# Patient Record
Sex: MALE | Race: WHITE | Employment: FULL TIME | URBAN - METROPOLITAN AREA
[De-identification: names, ages, dates, MRNs, and addresses within clinical notes are randomized per-mention and may not be internally consistent; named-entity substitution may affect disease eponyms.]

---

## 2022-03-19 PROBLEM — R03.0 ELEVATED BP WITHOUT DIAGNOSIS OF HYPERTENSION: Status: ACTIVE | Noted: 2020-06-24

## 2022-08-29 ENCOUNTER — OFFICE VISIT (OUTPATIENT)
Dept: OCCUPATIONAL MEDICINE | Age: 37
End: 2022-08-29

## 2022-08-29 DIAGNOSIS — M1A.09X0 IDIOPATHIC CHRONIC GOUT, MULTIPLE SITES, WITHOUT TOPHUS (TOPHI): Primary | ICD-10-CM

## 2022-08-29 PROCEDURE — 99212 OFFICE O/P EST SF 10 MIN: CPT | Performed by: NURSE PRACTITIONER

## 2022-08-29 RX ORDER — ALLOPURINOL 100 MG/1
250 TABLET ORAL EVERY MORNING
COMMUNITY
End: 2022-08-29 | Stop reason: SDUPTHER

## 2022-08-29 RX ORDER — VENLAFAXINE HYDROCHLORIDE 37.5 MG/1
37.5 CAPSULE, EXTENDED RELEASE ORAL EVERY MORNING
COMMUNITY
Start: 2022-01-19 | End: 2022-11-04 | Stop reason: SDUPTHER

## 2022-08-29 RX ORDER — ALLOPURINOL 100 MG/1
250 TABLET ORAL EVERY MORNING
Qty: 30 TABLET | Refills: 0 | Status: SHIPPED | OUTPATIENT
Start: 2022-08-29 | End: 2022-10-07 | Stop reason: SDUPTHER

## 2022-08-29 NOTE — PROGRESS NOTES
Re Lang is a 40 y.o. male Here today for medication refill on   Requested Prescriptions     Signed Prescriptions Disp Refills    allopurinol (ZYLOPRIM) 100 MG tablet 30 tablet 0     Sig: Take 2.5 tablets by mouth every morning     Has been tolerating medication without problem. Uses as prescribed and tolerates well without side effects/adverse reactions. Denies SOB/CP/N/V/Fever/rash/visio changes/palpitations/ syncope/pain/arthralgia/myalgia/suicidal or homicidal ideations. Alert and oriented x3; No acute distress. Well groomed, steady gait. S1 S2 Regular rate and rhythm, no JVD, no carotid bruit, no murmur/gallops/rubs  Lungs clear to auscultation bilaterally  Skin warm dry intact, mucous membranes moist    A:    Diagnosis Orders   1. Idiopathic chronic gout, multiple sites, without tophus (tophi)  allopurinol (ZYLOPRIM) 100 MG tablet          Follow up: Patient was encouraged to return to the clinic and/or PCP if s/s persist or do not resolve completely. Also advised to seek emergent care if worsening signs and symptoms warrant immediate evaluation including, but not limited to HA, blurred vision, speech disturbance, difficulty with ambulation/gait, numbness, tingling, weakness, syncope, abdominal pain, chest pain, or shortness of breath. *Side effects, adverse effects, risks versus benefits associated with medications prescribed/recommended were discussed with the patient. Patient verbalized understanding. All questions answered. Patient to follow up with PCP as scheduled.       * I have reviewed the patient's medication list, past medical, family, social, and surgical    history and updated the patient record appropriately      Social History     Socioeconomic History    Marital status:      Spouse name: Not on file    Number of children: Not on file    Years of education: Not on file    Highest education level: Not on file   Occupational History    Not on file   Tobacco Use    Smoking status: Never    Smokeless tobacco: Not on file   Substance and Sexual Activity    Alcohol use: Yes    Drug use: No    Sexual activity: Not on file   Other Topics Concern    Not on file   Social History Narrative    Not on file     Social Determinants of Health     Financial Resource Strain: Not on file   Food Insecurity: Not on file   Transportation Needs: Not on file   Physical Activity: Not on file   Stress: Not on file   Social Connections: Not on file   Intimate Partner Violence: Not on file   Housing Stability: Not on file     No past medical history on file. Past Surgical History:   Procedure Laterality Date    ORTHOPEDIC SURGERY      Left Knee     No family history on file.

## 2022-10-07 ENCOUNTER — OFFICE VISIT (OUTPATIENT)
Dept: OCCUPATIONAL MEDICINE | Age: 37
End: 2022-10-07

## 2022-10-07 DIAGNOSIS — M1A.09X0 IDIOPATHIC CHRONIC GOUT, MULTIPLE SITES, WITHOUT TOPHUS (TOPHI): ICD-10-CM

## 2022-10-07 DIAGNOSIS — R51.9 ACUTE NONINTRACTABLE HEADACHE, UNSPECIFIED HEADACHE TYPE: Primary | ICD-10-CM

## 2022-10-07 DIAGNOSIS — R11.2 NAUSEA AND VOMITING, UNSPECIFIED VOMITING TYPE: ICD-10-CM

## 2022-10-07 DIAGNOSIS — Z76.0 MEDICATION REFILL: ICD-10-CM

## 2022-10-07 LAB
GLUCOSE, POC: 103 MG/DL
QUICKVUE INFLUENZA TEST: NEGATIVE
SARS-COV-2, POC: NORMAL
VALID INTERNAL CONTROL, POC: YES

## 2022-10-07 RX ORDER — ALLOPURINOL 100 MG/1
100 TABLET ORAL EVERY MORNING
Qty: 90 TABLET | Refills: 0 | Status: SHIPPED | OUTPATIENT
Start: 2022-10-07

## 2022-10-07 ASSESSMENT — ENCOUNTER SYMPTOMS
NAUSEA: 1
PHOTOPHOBIA: 1
RESPIRATORY NEGATIVE: 1
VOMITING: 1
ALLERGIC/IMMUNOLOGIC NEGATIVE: 1
DIARRHEA: 0

## 2022-10-07 NOTE — PROGRESS NOTES
PROGRESS NOTE    SUBJECTIVE:   Michelle Limon is a 40 y.o. male seen for headache a long with nausea and vomiting that started yesterday evening. Patient reports history of headaches (1 every 2 months) for which he takes Excedrin or Tylenol and this helps. Denies aura with headache; however, he does have sensitivity to light and sounds during headache. He has had nausea/vomiting with headaches previously. Denies any exposure to sick contacts at work or home. He is having a birthday party for his child tomorrow and would like to be tested for Covid just to be sure. Also, he requests a medication refill of his allopurinol for gout. He usually takes only one 100 mg tab every morning even though the bottle states take 2.5 tabs every morning. 100 mg works to prevent his gout attacks and he is tolerating this medication well. Chief Complaint    Headache; Medication Refill       Headache  Headache pattern:  Headache sometimes there, sometimes not at all  Initial event:  None  Recent changes:  No recent change in headache pattern  Frequency:  Less than 1 per month  Providers seen:  None  Laterality:  Both sides at the same time  Location:  Temples/sides and back of head  Headaches last more than three days?: No    Aggravating factors:  Light and noise    Current Outpatient Medications   Medication Sig Dispense Refill    allopurinol (ZYLOPRIM) 100 MG tablet Take 1 tablet by mouth every morning 90 tablet 0    venlafaxine (EFFEXOR XR) 37.5 MG extended release capsule Take 37.5 mg by mouth every morning       No current facility-administered medications for this visit. No Known Allergies    Social History     Tobacco Use    Smoking status: Never   Substance Use Topics    Alcohol use: Yes    Drug use: No      Review of Systems   Constitutional:  Positive for appetite change and diaphoresis. Patient states he did not feel like eating this morning; although, he usually eats breakfast everyday. HENT: Negative. Eyes:  Positive for photophobia. Respiratory: Negative. Cardiovascular: Negative. Gastrointestinal:  Positive for nausea and vomiting. Negative for diarrhea. Endocrine: Negative. Genitourinary: Negative. Musculoskeletal: Negative. Skin: Negative. Allergic/Immunologic: Negative. Neurological:  Positive for headaches. Negative for dizziness, tremors, seizures, syncope, facial asymmetry, speech difficulty, weakness, light-headedness and numbness. Hematological: Negative. Psychiatric/Behavioral: Negative. OBJECTIVE:    Results for orders placed or performed in visit on 10/07/22   AMB POC SARS-COV-2   Result Value Ref Range    SARS-COV-2, POC Not-Detected Not Detected   AMB POC RAPID INFLUENZA TEST   Result Value Ref Range    Valid Internal Control, POC Yes     QuickVue Influenza test Negative Negative   AMB POC GLUCOSE BLOOD, BY GLUCOSE MONITORING DEVICE   Result Value Ref Range    Glucose,  MG/DL     Physical Exam  Constitutional:       General: He is not in acute distress. Appearance: Normal appearance. He is diaphoretic. HENT:      Head: Normocephalic and atraumatic. Pulmonary:      Effort: Pulmonary effort is normal.   Skin:     General: Skin is warm. Neurological:      Mental Status: He is alert and oriented to person, place, and time. Psychiatric:         Mood and Affect: Mood normal.         Behavior: Behavior normal.       ASSESSMENT and PLAN    Ann Marie Franco was seen today for headache and medication refill.     Diagnoses and all orders for this visit:    Acute nonintractable headache, unspecified headache type  -     AMB POC SARS-COV-2  -     AMB POC RAPID INFLUENZA TEST  -     AMB POC GLUCOSE BLOOD, BY GLUCOSE MONITORING DEVICE    Nausea and vomiting, unspecified vomiting type  -     AMB POC SARS-COV-2  -     AMB POC RAPID INFLUENZA TEST  -     AMB POC GLUCOSE BLOOD, BY GLUCOSE MONITORING DEVICE    Idiopathic chronic gout, multiple sites, without tophus (tophi)  -     allopurinol (ZYLOPRIM) 100 MG tablet; Take 1 tablet by mouth every morning    Medication refill    Covid and flu negative. Can retest for Covid at home tomorrow before [place] party. Continue Excedrin or Tylenol as needed for headaches. [facility]pto bismol from [facility]  as needed for GI upset. RTC if symptoms worsen or fail to improve. To ER with severe and/or unrelenting headache, change in mental status, numbness/tingling in extremities, severe abdominal pain, unable to eat/drink due to nausea/vomiting. Eat breakfast and stay hydrated. Counseled on benefits of having a primary care provider which includes, but is not limited to, continuity of care and having a medical home when concerns arise. Also enforced that onsite clinic policy states that we are not to take the place of a primary care provider, pt verbalized understanding. SEs and risk vs benefits associated with medications prescribed discussed with patient who verbalized understanding. Pt verbalized understanding and agreement with plan of care. RTC for persisting/worsening symptoms or new complaints that arise. Discussed signs and symptoms that would warrant immediate evaluation including, but not limited to HA, blurred vision, speech disturbance, difficulty with ambulation/gait, numbness, tingling, weakness, syncope, chest pain, or shortness of breath. I have reviewed the patient's medication list, past medical, family, social, and surgical history in detail and updated the patient record appropriately.     Dallas Notice, APRN - CNP

## 2022-11-04 ENCOUNTER — OFFICE VISIT (OUTPATIENT)
Dept: OCCUPATIONAL MEDICINE | Age: 37
End: 2022-11-04

## 2022-11-04 DIAGNOSIS — Z76.0 ENCOUNTER FOR MEDICATION REFILL: ICD-10-CM

## 2022-11-04 DIAGNOSIS — F34.89 OTHER SPECIFIED PERSISTENT MOOD DISORDERS (HCC): Primary | ICD-10-CM

## 2022-11-04 RX ORDER — VENLAFAXINE HYDROCHLORIDE 37.5 MG/1
37.5 CAPSULE, EXTENDED RELEASE ORAL EVERY MORNING
Qty: 90 CAPSULE | Refills: 0 | Status: SHIPPED | OUTPATIENT
Start: 2022-11-04

## 2022-11-04 NOTE — PROGRESS NOTES
PROGRESS NOTE    SUBJECTIVE:   Nestor Lopez is a 40 y.o. male seen for medication fill. Patient requests venlafaxine hydrochloride XR 37.5 mg. He takes this medication for anxiety and depression. Patient is tolerating this medication well. He only has dizziness and tiredness if he forgets to take his medication. Patient was prescribed this medication by Dr. [address] (PCP) at [address] [place] [address] in [facility]. Patient saw him last 1 year ago. Patient states he tried to get an appointment this week but they were fully booked. No other questions or concerns. Chief Complaint    Medication Refill       Current Outpatient Medications   Medication Sig Dispense Refill    venlafaxine (EFFEXOR XR) 37.5 MG extended release capsule Take 1 capsule by mouth every morning 90 capsule 0    allopurinol (ZYLOPRIM) 100 MG tablet Take 1 tablet by mouth every morning 90 tablet 0     No current facility-administered medications for this visit. No Known Allergies    Social History     Tobacco Use    Smoking status: Never   Substance Use Topics    Alcohol use: Yes    Drug use: No       OBJECTIVE:  There were no vitals taken for this visit. Physical Exam  Constitutional:       Appearance: Normal appearance. Pulmonary:      Effort: Pulmonary effort is normal.   Skin:     General: Skin is dry. Neurological:      Mental Status: He is alert and oriented to person, place, and time. Psychiatric:         Mood and Affect: Mood normal.         Behavior: Behavior normal.     ASSESSMENT and PLAN    Nuno Nick was seen today for medication refill. Diagnoses and all orders for this visit:    Other specified persistent mood disorders (HCC)  -     venlafaxine (EFFEXOR XR) 37.5 MG extended release capsule; Take 1 capsule by mouth every morning    Encounter for medication refill    Continue medication as prescribed. Follow up with PCP for routine care. To ER with homicidal/suicidal thoughts/panic attack.  RTC with any questions or concerns. Counseled on benefits of having a primary care provider which includes, but is not limited to, continuity of care and having a medical home when concerns arise. Also enforced that onsite clinic policy states that we are not to take the place of a primary care provider, pt verbalized understanding. SEs and risk vs benefits associated with medications prescribed discussed with patient who verbalized understanding. Pt verbalized understanding and agreement with plan of care. RTC for persisting/worsening symptoms or new complaints that arise. Discussed signs and symptoms that would warrant immediate evaluation including, but not limited to HA, blurred vision, speech disturbance, difficulty with ambulation/gait, numbness, tingling, weakness, syncope, chest pain, or shortness of breath. I have reviewed the patient's medication list, past medical, family, social, and surgical history in detail and updated the patient record appropriately.     Diane Kwon, APRN - CNP

## 2022-12-16 ENCOUNTER — OFFICE VISIT (OUTPATIENT)
Dept: OCCUPATIONAL MEDICINE | Age: 37
End: 2022-12-16

## 2022-12-16 VITALS
DIASTOLIC BLOOD PRESSURE: 80 MMHG | SYSTOLIC BLOOD PRESSURE: 120 MMHG | HEART RATE: 98 BPM | TEMPERATURE: 98.6 F | OXYGEN SATURATION: 99 %

## 2022-12-16 DIAGNOSIS — R09.81 NASAL CONGESTION: ICD-10-CM

## 2022-12-16 DIAGNOSIS — J01.90 ACUTE NON-RECURRENT SINUSITIS, UNSPECIFIED LOCATION: Primary | ICD-10-CM

## 2022-12-16 LAB
INFLUENZA A ANTIGEN, POC: NEGATIVE
INFLUENZA B ANTIGEN, POC: NEGATIVE
SARS-COV-2 RNA, POC: NEGATIVE

## 2022-12-16 RX ORDER — AMOXICILLIN AND CLAVULANATE POTASSIUM 875; 125 MG/1; MG/1
1 TABLET, FILM COATED ORAL 2 TIMES DAILY
Qty: 14 TABLET | Refills: 0 | Status: SHIPPED | OUTPATIENT
Start: 2022-12-16 | End: 2022-12-23

## 2022-12-16 ASSESSMENT — ENCOUNTER SYMPTOMS
CHEST TIGHTNESS: 0
SWOLLEN GLANDS: 0
WHEEZING: 0
HOARSE VOICE: 0
GASTROINTESTINAL NEGATIVE: 1
SORE THROAT: 1
COUGH: 1
RHINORRHEA: 1
EYES NEGATIVE: 1
SINUS PRESSURE: 1
SINUS PAIN: 1
SHORTNESS OF BREATH: 0

## 2022-12-16 NOTE — PROGRESS NOTES
PROGRESS NOTE    SUBJECTIVE:   Natalia Aponte is a 40 y.o. male seen for:    Chief Complaint    Sinusitis       Sinusitis  This is a new problem. The current episode started 1 to 4 weeks ago. The problem has been gradually worsening since onset. There has been no fever. The pain is moderate. Associated symptoms include chills, congestion, coughing, headaches, sinus pressure and a sore throat. Pertinent negatives include no diaphoresis, ear pain, hoarse voice, neck pain, shortness of breath, sneezing or swollen glands. Past treatments include nothing. Current Outpatient Medications   Medication Sig Dispense Refill    amoxicillin-clavulanate (AUGMENTIN) 875-125 MG per tablet Take 1 tablet by mouth 2 times daily for 7 days 14 tablet 0    venlafaxine (EFFEXOR XR) 37.5 MG extended release capsule Take 1 capsule by mouth every morning 90 capsule 0    allopurinol (ZYLOPRIM) 100 MG tablet Take 1 tablet by mouth every morning 90 tablet 0     No current facility-administered medications for this visit. No Known Allergies    Social History     Tobacco Use    Smoking status: Never   Substance Use Topics    Alcohol use: Yes    Drug use: No      Review of Systems   Constitutional:  Positive for chills. Negative for diaphoresis. HENT:  Positive for congestion, rhinorrhea, sinus pressure, sinus pain and sore throat. Negative for ear discharge, ear pain, hoarse voice and sneezing. Eyes: Negative. Respiratory:  Positive for cough. Negative for chest tightness, shortness of breath and wheezing. Cardiovascular: Negative. Gastrointestinal: Negative. Endocrine: Negative. Genitourinary: Negative. Musculoskeletal:  Negative for arthralgias, myalgias and neck pain. Skin: Negative. Allergic/Immunologic: Positive for environmental allergies. Neurological:  Positive for headaches. Negative for dizziness and light-headedness. Hematological: Negative. Psychiatric/Behavioral: Negative. OBJECTIVE:  /80 (Site: Left Upper Arm, Position: Sitting)   Pulse 98   Temp 98.6 °F (37 °C) (Oral)   SpO2 99%      Results for orders placed or performed in visit on 12/16/22   AMB POC SARS-COV-2 AND INFLUENZA A/B   Result Value Ref Range    SARS-COV-2 RNA, POC Negative     Influenza A Antigen, POC Negative Negative    Influenza B Antigen, POC Negative Negative     Physical Exam  Vitals reviewed. Constitutional:       Appearance: Normal appearance. HENT:      Head: Normocephalic and atraumatic. Right Ear: Hearing, ear canal and external ear normal. A middle ear effusion is present. Tympanic membrane is injected. Left Ear: Hearing, ear canal and external ear normal.  No middle ear effusion. Tympanic membrane is injected. Nose: Congestion and rhinorrhea present. Right Turbinates: Swollen. Left Turbinates: Swollen. Mouth/Throat:      Mouth: Mucous membranes are moist.      Pharynx: Oropharynx is clear. Posterior oropharyngeal erythema present. No oropharyngeal exudate. Eyes:      Conjunctiva/sclera: Conjunctivae normal.   Cardiovascular:      Rate and Rhythm: Normal rate and regular rhythm. Pulses: Normal pulses. Heart sounds: Normal heart sounds. Pulmonary:      Effort: Pulmonary effort is normal.      Breath sounds: Normal breath sounds. Skin:     General: Skin is warm and dry. Neurological:      Mental Status: He is alert and oriented to person, place, and time. Psychiatric:         Mood and Affect: Mood normal.         Behavior: Behavior normal.     ASSESSMENT and PLAN    Trent Godinez was seen today for sinusitis. Diagnoses and all orders for this visit:    Acute non-recurrent sinusitis, unspecified location  -     amoxicillin-clavulanate (AUGMENTIN) 875-125 MG per tablet; Take 1 tablet by mouth 2 times daily for 7 days    Nasal congestion  -     AMB POC SARS-COV-2 AND INFLUENZA A/B    COVID/FLU negative. Take Augmentin with food to avoid GI upset.  Sudafed per packaging instructions as needed for nasal congestion. Flonase per packaging instructions as needed for nasal congestion & seasonal allergies. To urgent care/ER if symptoms worsen over weekend. RTC if symptoms fail to improve with treatment. Counseled on benefits of having a primary care provider which includes, but is not limited to, continuity of care and having a medical home when concerns arise. Also enforced that onsite clinic policy states that we are not to take the place of a primary care provider, pt verbalized understanding. SEs and risk vs benefits associated with medications prescribed discussed with patient who verbalized understanding. Pt verbalized understanding and agreement with plan of care. RTC for persisting/worsening symptoms or new complaints that arise. Discussed signs and symptoms that would warrant immediate evaluation including, but not limited to HA, blurred vision, speech disturbance, difficulty with ambulation/gait, numbness, tingling, weakness, syncope, chest pain, or shortness of breath. I have reviewed the patient's medication list, past medical, family, social, and surgical history in detail and updated the patient record appropriately.     Darren Mascorro, APRN - CNP

## 2023-10-11 ENCOUNTER — OFFICE VISIT (OUTPATIENT)
Dept: OCCUPATIONAL MEDICINE | Age: 38
End: 2023-10-11

## 2023-10-11 DIAGNOSIS — H91.93 BILATERAL HEARING LOSS, UNSPECIFIED HEARING LOSS TYPE: ICD-10-CM

## 2023-10-11 DIAGNOSIS — H66.90 ACUTE OTITIS MEDIA, UNSPECIFIED OTITIS MEDIA TYPE: Primary | ICD-10-CM

## 2023-10-11 RX ORDER — AMOXICILLIN AND CLAVULANATE POTASSIUM 875; 125 MG/1; MG/1
1 TABLET, FILM COATED ORAL 2 TIMES DAILY
Qty: 14 TABLET | Refills: 0 | Status: SHIPPED | OUTPATIENT
Start: 2023-10-11 | End: 2023-10-18

## 2023-10-11 ASSESSMENT — ENCOUNTER SYMPTOMS
SINUS PAIN: 0
DIARRHEA: 0
SORE THROAT: 0
EYES NEGATIVE: 1
VOMITING: 0
SHORTNESS OF BREATH: 0
SINUS PRESSURE: 0
COUGH: 0
RHINORRHEA: 0
ABDOMINAL PAIN: 0

## 2023-10-11 NOTE — PROGRESS NOTES
Negative. Musculoskeletal:  Negative for arthralgias, myalgias and neck pain. Skin:  Negative for rash. Allergic/Immunologic: Positive for environmental allergies. Neurological:  Negative for dizziness and headaches. Hematological: Negative. Psychiatric/Behavioral: Negative. OBJECTIVE:    Physical Exam  Constitutional:       General: He is not in acute distress. Appearance: Normal appearance. He is not ill-appearing, toxic-appearing or diaphoretic. HENT:      Head: Normocephalic and atraumatic. Right Ear: Decreased hearing noted. Tympanic membrane is erythematous and bulging. Left Ear: Tympanic membrane, ear canal and external ear normal. Decreased hearing noted. Nose: Nose normal.      Mouth/Throat:      Mouth: Mucous membranes are moist.      Pharynx: Oropharynx is clear. Pulmonary:      Effort: Pulmonary effort is normal.   Skin:     General: Skin is warm and dry. Neurological:      Mental Status: He is alert and oriented to person, place, and time. Psychiatric:         Mood and Affect: Mood normal.         Behavior: Behavior normal.     ASSESSMENT and PLAN    Jim Calloway was seen today for ear fullness and decreased hearing. Diagnoses and all orders for this visit:    Acute otitis media, unspecified otitis media type  Comments:  Return to clinic if  symptoms worsen or fail to improve with treatment. Orders:  -     amoxicillin-clavulanate (AUGMENTIN) 875-125 MG per tablet; Take 1 tablet by mouth 2 times daily for 7 days    Bilateral hearing loss, unspecified hearing loss type  -     [facility]    To [facility] for further evaluation of hearing loss. Provided patient with office number to call and schedule appointment. Reach out to clinic with any questions or concerns.  Follow up with PCP for routine physical.     Counseled on benefits of having a primary care provider which includes, but is not limited to, continuity of care and having a

## 2023-11-07 ENCOUNTER — OFFICE VISIT (OUTPATIENT)
Dept: AUDIOLOGY | Age: 38
End: 2023-11-07
Payer: COMMERCIAL

## 2023-11-07 DIAGNOSIS — H90.6 MIXED HEARING LOSS, BILATERAL: Primary | ICD-10-CM

## 2023-11-07 PROCEDURE — 92557 COMPREHENSIVE HEARING TEST: CPT | Performed by: AUDIOLOGIST

## 2023-11-07 PROCEDURE — 92567 TYMPANOMETRY: CPT | Performed by: AUDIOLOGIST

## 2023-11-07 NOTE — PROGRESS NOTES
AUDIOLOGY EVALUATION    Keren Boone had Tympanometry and Audiometry performed today. The patient reports hearing loss. Results as follows:    Tympanometry    Type C -  bilaterally    Audiometry    Test Performed - Comprehensive Audiogram    Type of Loss - Right Ear: abnormal hearing: degree of loss is normal to mild mixed krissy loss                           Left Ear: abnormal hearing: degree of loss is normal to moderate mixed krissy loss     SRT   Measurement Right Ear Left Ear   Value 30 30   Unit dB dB     Discrimination  Measurement Right Ear Left Ear   Value 100% 100%   Unit dB dB     Recommend  Retest following otologic management    A.  [address] [facility], [address] [facility]  Audiologist

## 2023-11-10 ENCOUNTER — OFFICE VISIT (OUTPATIENT)
Dept: ENT CLINIC | Age: 38
End: 2023-11-10

## 2023-11-10 VITALS — HEIGHT: 78 IN | HEART RATE: 84 BPM | BODY MASS INDEX: 35.29 KG/M2 | OXYGEN SATURATION: 97 % | WEIGHT: 305 LBS

## 2023-11-10 DIAGNOSIS — H90.6 MIXED HEARING LOSS, BILATERAL: ICD-10-CM

## 2023-11-10 DIAGNOSIS — H69.93 DYSFUNCTION OF BOTH EUSTACHIAN TUBES: Primary | ICD-10-CM

## 2023-11-10 RX ORDER — AZELASTINE 1 MG/ML
2 SPRAY, METERED NASAL 2 TIMES DAILY
Qty: 120 ML | Refills: 1 | Status: SHIPPED | OUTPATIENT
Start: 2023-11-10

## 2023-11-10 NOTE — PROGRESS NOTES
Darylene Marshal, MD [address] [facility]  [address] [facility], [facility] [place] [facility]  P: [phone]          OFFICE VISIT       11/10/2023    Chief Complaint   Patient presents with    New Patient    Hearing Problem       HPI:  Julia Lara is a 45 y.o. male seen in consultation today at the request of nurse at work for   Chief Complaint   Patient presents with    New Patient    Hearing Problem   . Onset of symptoms: years  Frequency (daily, weekly,every night, every morning, constant): daily  Alleviating factors or medications: None  Associated with pain and if so scale (1-10): none  Is condition becoming progressively worse: yes  Associated symptoms within upper aerodigestive tract: nasal congestion sometimes, difficulty popping ears      Current Outpatient Medications:     azelastine (ASTELIN) 0.1 % nasal spray, 2 sprays by Nasal route 2 times daily Use in each nostril as directed, Disp: 120 mL, Rfl: 1    allopurinol (ZYLOPRIM) 300 MG tablet, , Disp: , Rfl:     venlafaxine (EFFEXOR XR) 37.5 MG extended release capsule, Take 1 capsule by mouth every morning, Disp: 90 capsule, Rfl: 0    Past Medical History:   Diagnosis Date    Anxiety     Depression     Gout     Hearing loss        Past Surgical History:   Procedure Laterality Date    MYRINGOTOMY W/ TUBES      ORTHOPEDIC SURGERY      Left Knee        No family history on file.      Social History     Socioeconomic History    Marital status:      Spouse name: Not on file    Number of children: Not on file    Years of education: Not on file    Highest education level: Not on file   Occupational History    Not on file   Tobacco Use    Smoking status: Never    Smokeless tobacco: Not on file   Substance and Sexual Activity    Alcohol use: Yes    Drug use: No    Sexual activity: Not on file   Other Topics Concern    Not on file   Social History Narrative    Not on file     Social Determinants of Health     Financial Resource Strain: Not on file   Food

## 2023-12-13 ENCOUNTER — OFFICE VISIT (OUTPATIENT)
Dept: OCCUPATIONAL MEDICINE | Age: 38
End: 2023-12-13

## 2023-12-13 DIAGNOSIS — L25.9 CONTACT DERMATITIS, UNSPECIFIED CONTACT DERMATITIS TYPE, UNSPECIFIED TRIGGER: Primary | ICD-10-CM

## 2023-12-13 RX ORDER — PREDNISONE 5 MG/1
TABLET ORAL
Qty: 21 EACH | Refills: 0 | Status: SHIPPED | OUTPATIENT
Start: 2023-12-13 | End: 2023-12-15 | Stop reason: ALTCHOICE

## 2023-12-13 NOTE — PROGRESS NOTES
PROGRESS NOTE    SUBJECTIVE:     Jennifer Lockhart is a 45 y.o. male seen for:    Chief Complaint    Rash       Rash  This is a new problem. The current episode started today. The problem has been gradually worsening since onset. The affected locations include the head, neck, back, torso and face. The rash is characterized by blistering, pain, redness and itchiness. He was exposed to nothing. Pertinent negatives include no anorexia, congestion, cough, diarrhea, eye pain, facial edema, fatigue, fever, joint pain, nail changes, rhinorrhea, shortness of breath, sore throat or vomiting. Past treatments include nothing. His past medical history is significant for allergies. There is no history of asthma, eczema or varicella. Denies recent exposure to plants/chemicals or new soaps/lotions. Patient believes he may have shingles, but has never experienced shingles before. Patient states the rash is painful when his clothing rubs it. Current Outpatient Medications   Medication Sig Dispense Refill    predniSONE 5 MG (21) TBPK Take as directed by 5 mg dosepack administration instructions 21 each 0    azelastine (ASTELIN) 0.1 % nasal spray 2 sprays by Nasal route 2 times daily Use in each nostril as directed 120 mL 1    allopurinol (ZYLOPRIM) 300 MG tablet       venlafaxine (EFFEXOR XR) 37.5 MG extended release capsule Take 1 capsule by mouth every morning 90 capsule 0     No current facility-administered medications for this visit. No Known Allergies    Social History     Tobacco Use    Smoking status: Never   Substance Use Topics    Alcohol use: Yes    Drug use: No      Review of Systems   Constitutional:  Negative for chills, diaphoresis, fatigue and fever. HENT:  Negative for congestion, rhinorrhea and sore throat. Eyes:  Negative for pain. Respiratory:  Negative for cough and shortness of breath. Cardiovascular: Negative. Gastrointestinal:  Negative for anorexia, diarrhea and vomiting.    Endocrine:

## 2023-12-15 ENCOUNTER — OFFICE VISIT (OUTPATIENT)
Dept: OCCUPATIONAL MEDICINE | Age: 38
End: 2023-12-15

## 2023-12-15 DIAGNOSIS — B02.9 HERPES ZOSTER WITHOUT COMPLICATION: Primary | ICD-10-CM

## 2023-12-15 RX ORDER — VALACYCLOVIR HYDROCHLORIDE 1 G/1
1000 TABLET, FILM COATED ORAL 3 TIMES DAILY
Qty: 21 TABLET | Refills: 0 | Status: SHIPPED | OUTPATIENT
Start: 2023-12-15 | End: 2023-12-22

## 2023-12-15 NOTE — PROGRESS NOTES
PROGRESS NOTE    SUBJECTIVE:     Sienna Glez is a 45 y.o. male seen for:    Chief Complaint    Rash; Follow-up       Patient last seen in this clinic on Wed 12/13 for rash. Patient was given Prednisone dose pack for contact dermatitis. Patient states he started the Prednisone on Wednesday afternoon as prescribed, but has noticed the rash has worsened. The rash is characterized as painful, itchy, burning, and blistering. Patient states the rash on his back and abdomen have worsened; however, the rash on his left neck and forehead have improved. Denies history of shingles and/or eczema. Current Outpatient Medications   Medication Sig Dispense Refill    valACYclovir (VALTREX) 1 g tablet Take 1 tablet by mouth 3 times daily for 7 days 21 tablet 0    azelastine (ASTELIN) 0.1 % nasal spray 2 sprays by Nasal route 2 times daily Use in each nostril as directed 120 mL 1    allopurinol (ZYLOPRIM) 300 MG tablet       venlafaxine (EFFEXOR XR) 37.5 MG extended release capsule Take 1 capsule by mouth every morning 90 capsule 0     No current facility-administered medications for this visit. No Known Allergies    Social History     Tobacco Use    Smoking status: Never   Substance Use Topics    Alcohol use: Yes    Drug use: No      Review of Systems   Constitutional: Negative. HENT: Negative. Eyes: Negative. Respiratory: Negative. Cardiovascular: Negative. Gastrointestinal: Negative. Endocrine: Negative. Genitourinary: Negative. Musculoskeletal: Negative. Skin:  Positive for rash. Allergic/Immunologic: Negative. Neurological: Negative. Hematological: Negative. Psychiatric/Behavioral: Negative. OBJECTIVE:    Physical Exam  Constitutional:       General: He is not in acute distress. Appearance: Normal appearance. He is not ill-appearing, toxic-appearing or diaphoretic. HENT:      Head: Normocephalic and atraumatic.    Pulmonary:      Effort: Pulmonary effort is normal.

## 2024-01-02 ENCOUNTER — TELEPHONE (OUTPATIENT)
Dept: ENT CLINIC | Age: 39
End: 2024-01-02

## 2024-01-02 NOTE — TELEPHONE ENCOUNTER
Patient called stating he has a appointment tomorrow with audio as recommended by Dr. Prater at Last office visit and f/u with Doctor after. Patient was wondering if he could cancel audio due to cost and him feeling not much improvement in opening ears except for a couple minutes after ear exercises. He wanted to see if he could cancel audio or see Moi before audio. I told he we can discuss that when he comes in but believe doctor would like for him to still have audio.

## 2024-01-03 ENCOUNTER — OFFICE VISIT (OUTPATIENT)
Dept: ENT CLINIC | Age: 39
End: 2024-01-03
Payer: COMMERCIAL

## 2024-01-03 ENCOUNTER — OFFICE VISIT (OUTPATIENT)
Dept: AUDIOLOGY | Age: 39
End: 2024-01-03
Payer: COMMERCIAL

## 2024-01-03 VITALS
HEIGHT: 78 IN | DIASTOLIC BLOOD PRESSURE: 62 MMHG | BODY MASS INDEX: 35.75 KG/M2 | WEIGHT: 309 LBS | SYSTOLIC BLOOD PRESSURE: 124 MMHG

## 2024-01-03 DIAGNOSIS — H90.6 MIXED HEARING LOSS, BILATERAL: Primary | ICD-10-CM

## 2024-01-03 DIAGNOSIS — H80.90 OTOSCLEROSIS, UNSPECIFIED LATERALITY: ICD-10-CM

## 2024-01-03 PROCEDURE — 92557 COMPREHENSIVE HEARING TEST: CPT | Performed by: AUDIOLOGIST

## 2024-01-03 PROCEDURE — 92567 TYMPANOMETRY: CPT | Performed by: AUDIOLOGIST

## 2024-01-03 PROCEDURE — 99213 OFFICE O/P EST LOW 20 MIN: CPT | Performed by: OTOLARYNGOLOGY

## 2024-01-03 NOTE — PROGRESS NOTES
AUDIOLOGY EVALUATION    Ulices Wall had Tympanometry and Audiometry performed today.    The patient reports no changes since his last audio on 11/7/23.     Results as follows:    Tympanometry    Type B -  on right  Type C -  on left    Audiometry    Test Performed - Comprehensive Audiogram    Type of Loss - Right Ear: abnormal hearing: degree of loss is normal to mild mixed krissy loss                           Left Ear: abnormal hearing: degree of loss is normal to mild mixed krissy loss     SRT   Measurement Right Ear Left Ear   Value 25 25   Unit dB dB     Discrimination  Measurement Right Ear Left Ear   Value 100% 100%   Unit dB dB     Recommend  Retest following otologic management    Karuna Silva [facility]  Audiologist   No

## 2024-01-03 NOTE — PROGRESS NOTES
ear effusion.      STUDIES REVIEWED:   Audiogram was obtained and compared to previous, showing bilateral mixed hearing loss with preservation of hearing at 2K bilaterally.  Type C tympanograms.        ASSESSMENT AND PLAN:       ICD-10-CM    1. Mixed hearing loss, bilateral  H90.6               Patient with eustachian tube dysfunction and mixed hearing loss without evidence of middle ear effusion on serial exam.  Audiogram findings raise suspicion for early otosclerosis.  Recommend evaluation with Dr. Juan Jose Orellana MD for consideration for middle ear exploration.  Referral placed.      The patient diagnoses and management plan were discussed at length. They  demonstrated and understanding of the plan and stated that all questions were answered to their satisfaction.     PATIENT EDUCATION / INSTRUCTIONS GIVEN FOR:   Hearing rehabilitation options

## 2024-01-17 ENCOUNTER — OFFICE VISIT (OUTPATIENT)
Age: 39
End: 2024-01-17

## 2024-01-17 DIAGNOSIS — Z76.0 MEDICATION REFILL: Primary | ICD-10-CM

## 2024-01-17 RX ORDER — ALLOPURINOL 300 MG/1
300 TABLET ORAL DAILY
Qty: 30 TABLET | Refills: 0 | Status: SHIPPED | OUTPATIENT
Start: 2024-01-17

## 2024-01-17 RX ORDER — VENLAFAXINE HYDROCHLORIDE 37.5 MG/1
37.5 CAPSULE, EXTENDED RELEASE ORAL DAILY
Qty: 30 CAPSULE | Refills: 0 | Status: SHIPPED | OUTPATIENT
Start: 2024-01-17

## 2024-01-17 NOTE — PROGRESS NOTES
PROGRESS NOTE    SUBJECTIVE:     Ulices Wall is a 38 y.o. male seen for:    Chief Complaint    Medication Refill       Patient requests refill of allopurinol for gout and venlafaxine for anixiety/depression. Patient tolerates both of these medications well without any questions or concerns. Patient is due for routine physical with PCP.     Current Outpatient Medications   Medication Sig Dispense Refill    allopurinol (ZYLOPRIM) 300 MG tablet Take 1 tablet by mouth daily 30 tablet 0    venlafaxine (EFFEXOR XR) 37.5 MG extended release capsule Take 1 capsule by mouth daily 30 capsule 0     No current facility-administered medications for this visit.      No Known Allergies    Social History     Tobacco Use    Smoking status: Never    Smokeless tobacco: Never   Substance Use Topics    Alcohol use: Yes    Drug use: No     Review of Systems   All other systems reviewed and are negative.    OBJECTIVE:    Physical Exam  Constitutional:       General: He is not in acute distress.     Appearance: Normal appearance. He is not ill-appearing, toxic-appearing or diaphoretic.   Pulmonary:      Effort: Pulmonary effort is normal.   Neurological:      General: No focal deficit present.      Mental Status: He is alert and oriented to person, place, and time.   Psychiatric:         Mood and Affect: Mood normal.         Behavior: Behavior normal.         Thought Content: Thought content normal.         Judgment: Judgment normal.     ASSESSMENT and PLAN    lUices was seen today for medication refill.    Diagnoses and all orders for this visit:    Medication refill  -     allopurinol (ZYLOPRIM) 300 MG tablet; Take 1 tablet by mouth daily  -     venlafaxine (EFFEXOR XR) 37.5 MG extended release capsule; Take 1 capsule by mouth daily    Provided patient with only 30 tablets of both allopurinol and venlafaxine. Informed patient he will need to see his PCP for extended refills of these medications for they require routine lab

## 2024-04-26 DIAGNOSIS — R71.8 LOW MEAN CORPUSCULAR VOLUME (MCV): ICD-10-CM

## 2024-04-26 DIAGNOSIS — R71.8 ELEVATED RED BLOOD CELL COUNT: Primary | ICD-10-CM

## 2024-04-29 ENCOUNTER — OFFICE VISIT (OUTPATIENT)
Age: 39
End: 2024-04-29

## 2024-04-29 DIAGNOSIS — Z76.89 ENCOUNTER TO ESTABLISH CARE WITH NEW DOCTOR: Primary | ICD-10-CM

## 2024-04-29 NOTE — PROGRESS NOTES
PROGRESS NOTE    SUBJECTIVE:     Ulices Wall is a 38 y.o. male seen for:    Chief Complaint    Psych Referral       Patient requests Psych Referral. Patient states he believes the Venlafaxine he's been taking for 2-3 years for anxiety and depression is not helping as much. Patient reports he is experiencing an increase in days where he feels more depressed and anxious than others. Denies any suicidal or homicidal thoughts. He tolerates this medication well. He gets this medication from his Primary Care Provider. Patient is interested in possible increasing his dosage or switching to another medication altogether. He would like to talk with a psych provider about his options. No other questions or concerns.     Current Outpatient Medications   Medication Sig Dispense Refill    allopurinol (ZYLOPRIM) 300 MG tablet Take 1 tablet by mouth daily 30 tablet 0    venlafaxine (EFFEXOR XR) 37.5 MG extended release capsule Take 1 capsule by mouth daily 30 capsule 0     No current facility-administered medications for this visit.      No Known Allergies    Social History     Tobacco Use    Smoking status: Never    Smokeless tobacco: Never   Substance Use Topics    Alcohol use: Yes    Drug use: No      Review of Systems   Psychiatric/Behavioral:          History of depression and anxiety   All other systems reviewed and are negative.    OBJECTIVE:    Physical Exam  Constitutional:       General: He is not in acute distress.     Appearance: Normal appearance. He is not ill-appearing, toxic-appearing or diaphoretic.   Cardiovascular:      Rate and Rhythm: Normal rate and regular rhythm.      Pulses: Normal pulses.      Heart sounds: Normal heart sounds.   Pulmonary:      Effort: Pulmonary effort is normal.      Breath sounds: Normal breath sounds.   Skin:     General: Skin is warm and dry.   Neurological:      General: No focal deficit present.      Mental Status: He is alert and oriented to person, place, and time.

## 2024-04-29 NOTE — PROGRESS NOTES
NEW PATIENT INTAKE    Referral Diagnosis: Elevated RBC      Referring Provider: ELIAZAR Alvarez MD    Primary Care Provider: ELIAZAR Alvarez MD      Family History of Cancer/ Hematology Disorders: No known family history    Presenting Symptoms: Elevated RBC    Chronological History of Pertinent Events:     37yo white male    PMH: Gout, MDD, Knee surgery, Vertigo, HTN     1/13/23 - Met with PCP ([facility])  Here to get Rx: Allopurinol refills  RBC elevated at 6.08. ([facility])    2/20/24 - Met with PCP ([facility])  Here to discuss meds  Taking Allopurinol; reports good control since starting it.  RBC elevated at 6.07. ([facility])    A referral has been placed with [facility] for a Medical Hematology consultation and treatment.    (1/13/23 - [facility])        (2/20/24 - [facility])          Notes from Referring Provider: N/A    Presented at Tumor Board: No    Other Pertinent Information: N/A

## 2024-04-30 ENCOUNTER — OFFICE VISIT (OUTPATIENT)
Dept: ONCOLOGY | Age: 39
End: 2024-04-30
Payer: COMMERCIAL

## 2024-04-30 ENCOUNTER — HOSPITAL ENCOUNTER (OUTPATIENT)
Dept: LAB | Age: 39
Discharge: HOME | End: 2024-05-03
Payer: COMMERCIAL

## 2024-04-30 VITALS
TEMPERATURE: 97.9 F | DIASTOLIC BLOOD PRESSURE: 84 MMHG | RESPIRATION RATE: 18 BRPM | WEIGHT: 305 LBS | SYSTOLIC BLOOD PRESSURE: 144 MMHG | OXYGEN SATURATION: 95 % | BODY MASS INDEX: 35.29 KG/M2 | HEART RATE: 87 BPM | HEIGHT: 78 IN

## 2024-04-30 DIAGNOSIS — R71.8 LOW MEAN CORPUSCULAR VOLUME (MCV): ICD-10-CM

## 2024-04-30 DIAGNOSIS — R71.8 MICROCYTOSIS: Primary | ICD-10-CM

## 2024-04-30 DIAGNOSIS — D64.9 MILD ANEMIA: ICD-10-CM

## 2024-04-30 DIAGNOSIS — R71.8 ELEVATED RED BLOOD CELL COUNT: ICD-10-CM

## 2024-04-30 LAB
ALBUMIN SERPL-MCNC: 3.8 G/DL (ref 3.5–5)
ALBUMIN/GLOB SERPL: 1 (ref 1–1.9)
ALP SERPL-CCNC: 73 U/L (ref 40–129)
ALT SERPL-CCNC: 40 U/L (ref 12–65)
ANION GAP SERPL CALC-SCNC: 13 MMOL/L (ref 9–18)
AST SERPL-CCNC: 60 U/L (ref 15–37)
BASOPHILS # BLD: 0.1 K/UL (ref 0–0.2)
BASOPHILS NFR BLD: 1 % (ref 0–2)
BILIRUB SERPL-MCNC: 0.4 MG/DL (ref 0–1.2)
BUN SERPL-MCNC: 17 MG/DL (ref 6–23)
CALCIUM SERPL-MCNC: 8.8 MG/DL (ref 8.8–10.2)
CHLORIDE SERPL-SCNC: 106 MMOL/L (ref 98–107)
CO2 SERPL-SCNC: 23 MMOL/L (ref 20–28)
CREAT SERPL-MCNC: 1.17 MG/DL (ref 0.8–1.3)
CRP SERPL HS-MCNC: 5.8 MG/L (ref 0–3)
DIFFERENTIAL METHOD BLD: ABNORMAL
EOSINOPHIL # BLD: 0.1 K/UL (ref 0–0.8)
EOSINOPHIL NFR BLD: 2 % (ref 0.5–7.8)
ERYTHROCYTE [DISTWIDTH] IN BLOOD BY AUTOMATED COUNT: 15.9 % (ref 11.9–14.6)
ERYTHROCYTE [SEDIMENTATION RATE] IN BLOOD: 17 MM/HR (ref 0–15)
FERRITIN SERPL-MCNC: 14 NG/ML (ref 8–388)
GLOBULIN SER CALC-MCNC: 3.8 G/DL (ref 2.3–3.5)
GLUCOSE SERPL-MCNC: 133 MG/DL (ref 70–99)
HAV IGM SER QL: NONREACTIVE
HBV CORE IGM SER QL: NONREACTIVE
HBV SURFACE AG SER QL: NONREACTIVE
HCT VFR BLD AUTO: 42.4 % (ref 41.1–50.3)
HCV AB SER QL: NONREACTIVE
HGB BLD-MCNC: 13.5 G/DL (ref 13.6–17.2)
HGB RETIC QN AUTO: 28 PG (ref 29–35)
HIV 1+2 AB+HIV1 P24 AG SERPL QL IA: NONREACTIVE
HIV 1/2 RESULT COMMENT: NORMAL
IMM GRANULOCYTES # BLD AUTO: 0 K/UL (ref 0–0.5)
IMM GRANULOCYTES NFR BLD AUTO: 0 % (ref 0–5)
IMM RETICS NFR: 11.3 % (ref 2.3–13.4)
LDH SERPL L TO P-CCNC: 177 U/L (ref 127–281)
LYMPHOCYTES # BLD: 1.2 K/UL (ref 0.5–4.6)
LYMPHOCYTES NFR BLD: 25 % (ref 13–44)
MCH RBC QN AUTO: 24.5 PG (ref 26.1–32.9)
MCHC RBC AUTO-ENTMCNC: 31.8 G/DL (ref 31.4–35)
MCV RBC AUTO: 76.8 FL (ref 82–102)
MONOCYTES # BLD: 0.5 K/UL (ref 0.1–1.3)
MONOCYTES NFR BLD: 9 % (ref 4–12)
NEUTS SEG # BLD: 3.1 K/UL (ref 1.7–8.2)
NEUTS SEG NFR BLD: 63 % (ref 43–78)
NRBC # BLD: 0 K/UL (ref 0–0.2)
PLATELET # BLD AUTO: 236 K/UL (ref 150–450)
PMV BLD AUTO: 9.3 FL (ref 9.4–12.3)
POTASSIUM SERPL-SCNC: 3.8 MMOL/L (ref 3.5–5.1)
PROT SERPL-MCNC: 7.6 G/DL (ref 6.3–8.2)
RBC # BLD AUTO: 5.52 M/UL (ref 4.23–5.6)
RETICS # AUTO: 0.06 M/UL (ref 0.03–0.1)
RETICS/RBC NFR AUTO: 1.1 % (ref 0.3–2)
SODIUM SERPL-SCNC: 142 MMOL/L (ref 136–145)
T4 FREE SERPL-MCNC: 1.1 NG/DL (ref 0.9–1.7)
TSH, 3RD GENERATION: 1.63 UIU/ML (ref 0.27–4.2)
URATE SERPL-MCNC: 5 MG/DL (ref 3.9–8.2)
WBC # BLD AUTO: 4.9 K/UL (ref 4.3–11.1)

## 2024-04-30 PROCEDURE — 85046 RETICYTE/HGB CONCENTRATE: CPT

## 2024-04-30 PROCEDURE — 36415 COLL VENOUS BLD VENIPUNCTURE: CPT

## 2024-04-30 PROCEDURE — 86235 NUCLEAR ANTIGEN ANTIBODY: CPT

## 2024-04-30 PROCEDURE — 84439 ASSAY OF FREE THYROXINE: CPT

## 2024-04-30 PROCEDURE — 83615 LACTATE (LD) (LDH) ENZYME: CPT

## 2024-04-30 PROCEDURE — 83020 HEMOGLOBIN ELECTROPHORESIS: CPT

## 2024-04-30 PROCEDURE — 84550 ASSAY OF BLOOD/URIC ACID: CPT

## 2024-04-30 PROCEDURE — 86141 C-REACTIVE PROTEIN HS: CPT

## 2024-04-30 PROCEDURE — 82728 ASSAY OF FERRITIN: CPT

## 2024-04-30 PROCEDURE — 86430 RHEUMATOID FACTOR TEST QUAL: CPT

## 2024-04-30 PROCEDURE — 80053 COMPREHEN METABOLIC PANEL: CPT

## 2024-04-30 PROCEDURE — 86225 DNA ANTIBODY NATIVE: CPT

## 2024-04-30 PROCEDURE — 84443 ASSAY THYROID STIM HORMONE: CPT

## 2024-04-30 PROCEDURE — 86431 RHEUMATOID FACTOR QUANT: CPT

## 2024-04-30 PROCEDURE — 85652 RBC SED RATE AUTOMATED: CPT

## 2024-04-30 PROCEDURE — 99205 OFFICE O/P NEW HI 60 MIN: CPT | Performed by: PEDIATRICS

## 2024-04-30 PROCEDURE — 82668 ASSAY OF ERYTHROPOIETIN: CPT

## 2024-04-30 PROCEDURE — 80074 ACUTE HEPATITIS PANEL: CPT

## 2024-04-30 PROCEDURE — 85025 COMPLETE CBC W/AUTO DIFF WBC: CPT

## 2024-04-30 PROCEDURE — 86038 ANTINUCLEAR ANTIBODIES: CPT

## 2024-04-30 PROCEDURE — 87389 HIV-1 AG W/HIV-1&-2 AB AG IA: CPT

## 2024-04-30 NOTE — PROGRESS NOTES
Per Dr Thompson:   Cancel [facility] labs and hemochromatosis mutation lab today.   [facility] lab notified.

## 2024-04-30 NOTE — PROGRESS NOTES
HISTORY OF PRESENT ILLNESS  Ulices is a 38 y.o. y.o. male with elevated RBC    ABSTRACT  Referral Diagnosis: Elevated RBC      Referring Provider: ELIAZAR Alvarez MD     Primary Care Provider: ELIAZAR Alvarez MD                         Family History of Cancer/ Hematology Disorders: No known family history     Presenting Symptoms: Elevated RBC     Chronological History of Pertinent Events:      39yo white male     PMH: Gout, MDD, Knee surgery, Vertigo, HTN      1/13/23 - Met with PCP ([facility])  Here to get Rx: Allopurinol refills  RBC elevated at 6.08. ([facility])     2/20/24 - Met with PCP ([facility])  Here to discuss meds  Taking Allopurinol; reports good control since starting it.  RBC elevated at 6.07. ([facility])     A referral has been placed with [facility] for a Medical Hematology consultation and treatment.     (1/13/23 - [facility])         (2/20/24 - [facility])            Notes from Referring Provider: N/A  HPI: chronic gout and getting labs and found to have concerns for RBC  -occ. fatigue  -no systemic   H/o sleep apnea on CPAP  H/o depression and anxiety    No iv infusion  No blood transfusion  No family hx anemia    [facility]        Patient Denies:   Fevers   Night Sweats   Chills   Weight Loss   Bone Pain   Lymphadenopathy  Patient Denies:  Nose bleeds  Gum bleeds  Bruising or petechia  Bleeding with surgery  Bleeding with accidents  Transfusions  History or free bleeding or hemophilia    Current Outpatient Medications   Medication Sig    allopurinol (ZYLOPRIM) 300 MG tablet Take 1 tablet by mouth daily    venlafaxine (EFFEXOR XR) 37.5 MG extended release capsule Take 1 capsule by mouth daily     No current facility-administered medications for this visit.       Past Medical History:   Diagnosis Date    Anxiety     Depression     Gout     Hearing loss        Past Surgical History:   Procedure Laterality Date    MYRINGOTOMY W/ TUBES      ORTHOPEDIC SURGERY      Left Knee       History

## 2024-04-30 NOTE — PATIENT INSTRUCTIONS
Patient Instructions from Today's Visit    Reason for Visit:  New patient referral for elevated RBC    Pt reports frequent blood donations    Plan:    Labs/history/plan reviewed per Dr Thompson.  -space out your blood donations to no more than twice a year as your hemoglobin and iron stores are actually a little low.     If your beta thalassemia lab testing (hemoglobin fractionation) is normal/negative, Dr Thompson recommends alpha thalassemia gene testing when you come back to clarify your case).       Follow Up:  6 months (repeat labs and OV)    Recent Lab Results:  Hospital Outpatient Visit on 04/30/2024   Component Date Value Ref Range Status    Sodium 04/30/2024 142  136 - 145 mmol/L Final    Potassium 04/30/2024 3.8  3.5 - 5.1 mmol/L Final    Chloride 04/30/2024 106  98 - 107 mmol/L Final    CO2 04/30/2024 23  20 - 28 mmol/L Final    Anion Gap 04/30/2024 13  9 - 18 mmol/L Final    Glucose 04/30/2024 133 (H)  70 - 99 mg/dL Final    Comment: <70 mg/dL Consistent with, but not fully diagnostic of hypoglycemia.  100 - 125 mg/dL Impaired fasting glucose/consistent with pre-diabetes mellitus.  > 126 mg/dl Fasting glucose consistent with overt diabetes mellitus      BUN 04/30/2024 17  6 - 23 MG/DL Final    Creatinine 04/30/2024 1.17  0.80 - 1.30 MG/DL Final    Est, Glom Filt Rate 04/30/2024 82  >60 ml/min/1.73m2 Final    Comment:    Pediatric calculator link: https://www.kidney.org/professionals/kdoqi/gfr_calculatorped     These results are not intended for use in patients <18 years of age.     eGFR results are calculated without a race factor using  the 2021 CKD-EPI equation. Careful clinical correlation is recommended, particularly when comparing to results calculated using previous equations.  The CKD-EPI equation is less accurate in patients with extremes of muscle mass, extra-renal metabolism of creatinine, excessive creatine ingestion, or following therapy that affects renal tubular secretion.      Calcium

## 2024-05-01 LAB
ANA SER QL: POSITIVE
CENTROMERE B AB SER-ACNC: <0.2 AI (ref 0–0.9)
CHROMATIN AB SERPL-ACNC: <0.2 AI (ref 0–0.9)
DSDNA AB SER-ACNC: <1 IU/ML (ref 0–9)
ENA JO1 AB SER-ACNC: <0.2 AI (ref 0–0.9)
ENA RNP AB SER-ACNC: 1.2 AI (ref 0–0.9)
ENA SCL70 AB SER-ACNC: <0.2 AI (ref 0–0.9)
ENA SM AB SER-ACNC: <0.2 AI (ref 0–0.9)
ENA SS-A AB SER-ACNC: <0.2 AI (ref 0–0.9)
ENA SS-B AB SER-ACNC: <0.2 AI (ref 0–0.9)
EPO SERPL-ACNC: 11.4 MIU/ML (ref 2.6–18.5)
Lab: ABNORMAL
RHEUMATOID FACT SER QL LA: POSITIVE
RHEUMATOID FACT TITR SER LA: NORMAL {TITER}

## 2024-05-03 LAB
HGB A MFR BLD: 98.2 % (ref 96.4–98.8)
HGB A2 MFR BLD COLUMN CHROM: 1.8 % (ref 1.8–3.2)
HGB F MFR BLD: 0 % (ref 0–2)
HGB FRACT BLD-IMP: NORMAL
HGB S MFR BLD: 0 %
PERIPHERAL SMEAR, MD REVIEW: NORMAL

## 2024-11-18 DIAGNOSIS — R71.8 MICROCYTOSIS: Primary | ICD-10-CM

## 2024-11-18 DIAGNOSIS — D64.9 MILD ANEMIA: ICD-10-CM

## 2024-11-19 ENCOUNTER — HOSPITAL ENCOUNTER (OUTPATIENT)
Dept: LAB | Age: 39
Discharge: HOME | End: 2024-11-19
Payer: COMMERCIAL

## 2024-11-19 ENCOUNTER — OFFICE VISIT (OUTPATIENT)
Dept: ONCOLOGY | Age: 39
End: 2024-11-19
Payer: COMMERCIAL

## 2024-11-19 VITALS
BODY MASS INDEX: 35.17 KG/M2 | WEIGHT: 304 LBS | RESPIRATION RATE: 18 BRPM | DIASTOLIC BLOOD PRESSURE: 89 MMHG | OXYGEN SATURATION: 97 % | HEIGHT: 78 IN | TEMPERATURE: 97.4 F | SYSTOLIC BLOOD PRESSURE: 141 MMHG | HEART RATE: 72 BPM

## 2024-11-19 DIAGNOSIS — R71.8 MICROCYTOSIS: Primary | ICD-10-CM

## 2024-11-19 DIAGNOSIS — D64.9 MILD ANEMIA: ICD-10-CM

## 2024-11-19 DIAGNOSIS — R71.8 MICROCYTOSIS: ICD-10-CM

## 2024-11-19 LAB
ALBUMIN SERPL-MCNC: 3.9 G/DL (ref 3.5–5)
ALBUMIN/GLOB SERPL: 1.1 (ref 1–1.9)
ALP SERPL-CCNC: 76 U/L (ref 40–129)
ALT SERPL-CCNC: 30 U/L (ref 8–55)
ANION GAP SERPL CALC-SCNC: 10 MMOL/L (ref 7–16)
AST SERPL-CCNC: 28 U/L (ref 15–37)
BASOPHILS # BLD: 0.1 K/UL (ref 0–0.2)
BASOPHILS NFR BLD: 1 % (ref 0–2)
BILIRUB SERPL-MCNC: 0.5 MG/DL (ref 0–1.2)
BUN SERPL-MCNC: 17 MG/DL (ref 6–23)
CALCIUM SERPL-MCNC: 8.9 MG/DL (ref 8.8–10.2)
CHLORIDE SERPL-SCNC: 105 MMOL/L (ref 98–107)
CO2 SERPL-SCNC: 23 MMOL/L (ref 20–29)
CREAT SERPL-MCNC: 1.19 MG/DL (ref 0.8–1.3)
DIFFERENTIAL METHOD BLD: ABNORMAL
EOSINOPHIL # BLD: 0.1 K/UL (ref 0–0.8)
EOSINOPHIL NFR BLD: 2 % (ref 0.5–7.8)
ERYTHROCYTE [DISTWIDTH] IN BLOOD BY AUTOMATED COUNT: 15 % (ref 11.9–14.6)
FERRITIN SERPL-MCNC: 35 NG/ML (ref 8–388)
GLOBULIN SER CALC-MCNC: 3.7 G/DL (ref 2.3–3.5)
GLUCOSE SERPL-MCNC: 103 MG/DL (ref 70–99)
HCT VFR BLD AUTO: 46.7 % (ref 41.1–50.3)
HGB BLD-MCNC: 15.5 G/DL (ref 13.6–17.2)
HGB RETIC QN AUTO: 31 PG (ref 29–35)
IMM GRANULOCYTES # BLD AUTO: 0 K/UL (ref 0–0.5)
IMM GRANULOCYTES NFR BLD AUTO: 0 % (ref 0–5)
IMM RETICS NFR: 8 % (ref 2.3–13.4)
IRON SATN MFR SERPL: 21 % (ref 20–50)
IRON SERPL-MCNC: 69 UG/DL (ref 35–100)
LYMPHOCYTES # BLD: 1.3 K/UL (ref 0.5–4.6)
LYMPHOCYTES NFR BLD: 27 % (ref 13–44)
MCH RBC QN AUTO: 27.1 PG (ref 26.1–32.9)
MCHC RBC AUTO-ENTMCNC: 33.2 G/DL (ref 31.4–35)
MCV RBC AUTO: 81.6 FL (ref 82–102)
MONOCYTES # BLD: 0.5 K/UL (ref 0.1–1.3)
MONOCYTES NFR BLD: 10 % (ref 4–12)
NEUTS SEG # BLD: 2.9 K/UL (ref 1.7–8.2)
NEUTS SEG NFR BLD: 60 % (ref 43–78)
NRBC # BLD: 0 K/UL (ref 0–0.2)
PLATELET # BLD AUTO: 191 K/UL (ref 150–450)
PMV BLD AUTO: 9.3 FL (ref 9.4–12.3)
POTASSIUM SERPL-SCNC: 4.1 MMOL/L (ref 3.5–5.1)
PROT SERPL-MCNC: 7.6 G/DL (ref 6.3–8.2)
RBC # BLD AUTO: 5.72 M/UL (ref 4.23–5.6)
RETICS # AUTO: 0.07 M/UL (ref 0.03–0.1)
RETICS/RBC NFR AUTO: 1.2 % (ref 0.3–2)
SODIUM SERPL-SCNC: 138 MMOL/L (ref 136–145)
TIBC SERPL-MCNC: 334 UG/DL (ref 240–450)
UIBC SERPL-MCNC: 265 UG/DL (ref 112–347)
WBC # BLD AUTO: 4.9 K/UL (ref 4.3–11.1)

## 2024-11-19 PROCEDURE — 82728 ASSAY OF FERRITIN: CPT

## 2024-11-19 PROCEDURE — 85046 RETICYTE/HGB CONCENTRATE: CPT

## 2024-11-19 PROCEDURE — 36415 COLL VENOUS BLD VENIPUNCTURE: CPT

## 2024-11-19 PROCEDURE — 85025 COMPLETE CBC W/AUTO DIFF WBC: CPT

## 2024-11-19 PROCEDURE — 80053 COMPREHEN METABOLIC PANEL: CPT

## 2024-11-19 PROCEDURE — 99213 OFFICE O/P EST LOW 20 MIN: CPT | Performed by: PEDIATRICS

## 2024-11-19 PROCEDURE — 83540 ASSAY OF IRON: CPT

## 2024-11-19 PROCEDURE — 83550 IRON BINDING TEST: CPT

## 2024-11-19 NOTE — PATIENT INSTRUCTIONS
Patient Information from Today's Visit    The members of your [facility] are listed below:    Physician Provider: Genet Thompson Medical Oncologist  Advanced Practice Clinician: Traci Banda NP  Registered Nurse: Nakia ULRICH RN  Nurse Navigator: N/A  Medical Assistant: Traci VELA MA  :Selena RUBIO  Supportive Care Services: Bev CHAU LMSW    Diagnosis: Microcytosis; Mild Anemia    Follow Up Instructions: As needed.    Labs reviewed.  Symptoms reviewed.  Alpha globin testing is still pending from today.    Treatment Summary has been discussed and given to patient:N/A      Current Labs:   Hospital Outpatient Visit on 11/19/2024   Component Date Value Ref Range Status    Ferritin 11/19/2024 35  8 - 388 NG/ML Final    Reticulocyte Count,Automated 11/19/2024 1.2  0.3 - 2.0 % Final    Absolute Retic # 11/19/2024 [id number]  0.026 - 0.095 M/ul Final    Immature Retic Fraction 11/19/2024 8.0  2.3 - 13.4 % Final    Retic Hemoglobin conc. 11/19/2024 31  29 - 35 pg Final    Sodium 11/19/2024 138  136 - 145 mmol/L Final    Potassium 11/19/2024 4.1  3.5 - 5.1 mmol/L Final    Chloride 11/19/2024 105  98 - 107 mmol/L Final    CO2 11/19/2024 23  20 - 29 mmol/L Final    Anion Gap 11/19/2024 10  7 - 16 mmol/L Final    Glucose 11/19/2024 103 (H)  70 - 99 mg/dL Final    Comment: <70 mg/dL Consistent with, but not fully diagnostic of hypoglycemia.  100 - 125 mg/dL Impaired fasting glucose/consistent with pre-diabetes mellitus.  > 126 mg/dl Fasting glucose consistent with overt diabetes mellitus      BUN 11/19/2024 17  6 - 23 MG/DL Final    Creatinine 11/19/2024 1.19  0.80 - 1.30 MG/DL Final    Est, Glom Filt Rate 11/19/2024 80  >60 ml/min/1.73m2 Final    Comment:    Pediatric calculator link: https://www.kidney.org/professionals/kdoqi/gfr_calculatorped     These results are not intended for use in patients <18 years of age.     eGFR results are calculated without a race factor using  the 2021 CKD-EPI equation.

## 2024-11-22 LAB
Lab: NORMAL
Lab: NORMAL
REFERENCE LAB: NORMAL

## 2024-12-21 LAB
Lab: NORMAL
Lab: NORMAL
REFERENCE LAB: NORMAL

## 2025-01-20 ENCOUNTER — OFFICE VISIT (OUTPATIENT)
Age: 40
End: 2025-01-20

## 2025-01-20 VITALS — OXYGEN SATURATION: 99 % | HEART RATE: 68 BPM | TEMPERATURE: 97.9 F

## 2025-01-20 DIAGNOSIS — R05.1 ACUTE COUGH: Primary | ICD-10-CM

## 2025-01-20 RX ORDER — LORATADINE 10 MG/1
10 TABLET ORAL DAILY
Qty: 30 TABLET | Refills: 2 | Status: SHIPPED | OUTPATIENT
Start: 2025-01-20

## 2025-01-20 RX ORDER — BENZONATATE 100 MG/1
100-200 CAPSULE ORAL 3 TIMES DAILY PRN
Qty: 60 CAPSULE | Refills: 0 | Status: SHIPPED | OUTPATIENT
Start: 2025-01-20 | End: 2025-01-30

## 2025-01-20 NOTE — PROGRESS NOTES
PROGRESS NOTE    SUBJECTIVE:   [facility].  He is a 39 y.o. male seen for fatigue one week post influenza.  States he is feeling better but still is feeling \"run down\". He would like to know what he can do to improve health status. He is still experiencing a cough but denies that the cough is keeping him up at night.         Current Outpatient Medications   Medication Sig Dispense Refill    benzonatate (TESSALON) 100 MG capsule Take 1-2 capsules by mouth 3 times daily as needed for Cough 60 capsule 0    loratadine (CLARITIN) 10 MG tablet Take 1 tablet by mouth daily 30 tablet 2    allopurinol (ZYLOPRIM) 300 MG tablet Take 1 tablet by mouth daily 30 tablet 0    venlafaxine (EFFEXOR XR) 37.5 MG extended release capsule Take 1 capsule by mouth daily 30 capsule 0     No current facility-administered medications for this visit.     No Known Allergies  Social History     Tobacco Use    Smoking status: Never    Smokeless tobacco: Never   Substance Use Topics    Alcohol use: Yes              OBJECTIVE:  Pulse 68   Temp 97.9 °F (36.6 °C)   SpO2 99%      Physical Exam  Constitutional:       General: He is not in acute distress.     Appearance: Normal appearance. He is well-developed and well-groomed. He is not ill-appearing, toxic-appearing or diaphoretic.   HENT:      Head: Normocephalic.      Right Ear: Ear canal and external ear normal. A middle ear effusion is present. Tympanic membrane is not injected, scarred, perforated, erythematous, retracted or bulging.      Left Ear: Ear canal and external ear normal.  No middle ear effusion. Tympanic membrane is not injected, scarred, perforated, erythematous, retracted or bulging.      Nose: Congestion present.      Mouth/Throat:      Mouth: Mucous membranes are moist.      Pharynx: No oropharyngeal exudate or posterior oropharyngeal erythema.   Cardiovascular:      Rate and Rhythm: Normal rate and regular

## 2025-07-17 LAB
CHOLESTEROL, TOTAL: 206 MG/DL (ref 100–199)
CHOLESTEROL/HDL RATIO: 4.5 (ref 0–5)
ESTIMATED AVERAGE GLUCOSE: NORMAL
HBA1C MFR BLD: 5.5 % (ref 4.8–5.6)
HDLC SERPL-MCNC: 46 MG/DL (ref 35–70)
LDL CHOLESTEROL: 133 (ref 0–99)
NONHDLC SERPL-MCNC: ABNORMAL MG/DL
TRIGL SERPL-MCNC: 149 MG/DL (ref 0–149)
VLDLC SERPL CALC-MCNC: 27 MG/DL (ref 5–40)

## 2025-08-29 ENCOUNTER — CLINICAL DOCUMENTATION (OUTPATIENT)
Age: 40
End: 2025-08-29